# Patient Record
Sex: FEMALE | Race: WHITE | Employment: PART TIME | URBAN - METROPOLITAN AREA
[De-identification: names, ages, dates, MRNs, and addresses within clinical notes are randomized per-mention and may not be internally consistent; named-entity substitution may affect disease eponyms.]

---

## 2024-09-03 ENCOUNTER — OFFICE VISIT (OUTPATIENT)
Dept: PODIATRY | Age: 74
End: 2024-09-03
Payer: MEDICARE

## 2024-09-03 VITALS — HEIGHT: 65 IN | WEIGHT: 174 LBS | BODY MASS INDEX: 28.99 KG/M2

## 2024-09-03 DIAGNOSIS — M79.671 RIGHT FOOT PAIN: Primary | ICD-10-CM

## 2024-09-03 DIAGNOSIS — R26.2 DIFFICULTY WALKING: ICD-10-CM

## 2024-09-03 DIAGNOSIS — M79.671 PAIN OF RIGHT FOOT: ICD-10-CM

## 2024-09-03 DIAGNOSIS — M21.621 TAILOR'S BUNION OF RIGHT FOOT: ICD-10-CM

## 2024-09-03 DIAGNOSIS — L84 CORNS AND CALLUS: Primary | ICD-10-CM

## 2024-09-03 PROCEDURE — 1090F PRES/ABSN URINE INCON ASSESS: CPT | Performed by: PODIATRIST

## 2024-09-03 PROCEDURE — G8427 DOCREV CUR MEDS BY ELIG CLIN: HCPCS | Performed by: PODIATRIST

## 2024-09-03 PROCEDURE — 4004F PT TOBACCO SCREEN RCVD TLK: CPT | Performed by: PODIATRIST

## 2024-09-03 PROCEDURE — 1123F ACP DISCUSS/DSCN MKR DOCD: CPT | Performed by: PODIATRIST

## 2024-09-03 PROCEDURE — G8400 PT W/DXA NO RESULTS DOC: HCPCS | Performed by: PODIATRIST

## 2024-09-03 PROCEDURE — G8419 CALC BMI OUT NRM PARAM NOF/U: HCPCS | Performed by: PODIATRIST

## 2024-09-03 PROCEDURE — 3017F COLORECTAL CA SCREEN DOC REV: CPT | Performed by: PODIATRIST

## 2024-09-03 PROCEDURE — 99203 OFFICE O/P NEW LOW 30 MIN: CPT | Performed by: PODIATRIST

## 2024-09-03 RX ORDER — FUROSEMIDE 40 MG
40 TABLET ORAL DAILY
COMMUNITY

## 2024-09-03 RX ORDER — LEVOTHYROXINE SODIUM 50 UG/1
50 TABLET ORAL DAILY
COMMUNITY

## 2024-09-03 NOTE — PROGRESS NOTES
9/3/24     Ashley Miramontes    : 1950 Sex: female   Age: 74 y.o.    Patient was referred by: None  Patient's PCP/Provider is:  Diogenes Plummer DO    Subjective:    Patient seen today for evaluation regarding painful corn/callosity right plantar forefoot region    Chief Complaint   Patient presents with    Foot Pain     Right foot pain, bunion       HPI: Patient stated she has had issues for several months now.  She has not tried any OTC treatments to this point in time.  She does wear custom inserts which were fabricated for her several years ago.  Patient has had multiple surgeries performed on both feet in the past.  No other additional abnormalities noted.    ROS:  Const: Positives and pertinent negatives as per HPI.     Musculo: Denies symptoms other than stated above.  Neuro: Denies symptoms other than stated above.  Skin: Denies symptoms other than stated above.    Current Medications:    Current Outpatient Medications:     levothyroxine (SYNTHROID) 50 MCG tablet, Take 1 tablet by mouth Daily, Disp: , Rfl:     furosemide (LASIX) 40 MG tablet, Take 1 tablet by mouth daily, Disp: , Rfl:     potassium & sodium phosphates (PHOS-NAK) 280-160-250 MG PACK, Take 1 packet by mouth 4 times daily, Disp: , Rfl:     Allergies:  No Known Allergies    Vitals:    24 1403   Weight: 78.9 kg (174 lb)   Height: 1.651 m (5' 5\")        No past medical history on file.  No family history on file.  No past surgical history on file.           Diagnostic studies:    No results found.      Procedures:    None    Exam:  VASCULAR: Pedal pulses palpable right foot.  Capillary refill time less than 5 seconds digits 1 through 5 right foot.  Diminished hair growth noted right lower extremity  NEUROLOGICAL: Epicritic sensations intact right foot and digital regions  DERMATOLOGICAL: Soft tissue neoplasm noted plantar right fifth MTPJ regions.  Upon partial-thickness debridement, no underlying ulcerations or any signs of

## 2024-09-03 NOTE — PROGRESS NOTES
New patient here for right foot pain, bunion. Patient has had previous foot surgery. Diogenes Plummer DO last visit 8/26/2024   Electronically signed by Cinthya Dunn LPN on 9/3/2024 at 2:06 PM

## 2024-09-10 ENCOUNTER — OFFICE VISIT (OUTPATIENT)
Dept: PODIATRY | Age: 74
End: 2024-09-10
Payer: MEDICARE

## 2024-09-10 VITALS — HEIGHT: 65 IN | BODY MASS INDEX: 28.99 KG/M2 | WEIGHT: 174 LBS

## 2024-09-10 DIAGNOSIS — L84 CORNS AND CALLUS: Primary | ICD-10-CM

## 2024-09-10 DIAGNOSIS — M21.621 TAILOR'S BUNION OF RIGHT FOOT: ICD-10-CM

## 2024-09-10 DIAGNOSIS — R26.2 DIFFICULTY WALKING: ICD-10-CM

## 2024-09-10 PROCEDURE — 3017F COLORECTAL CA SCREEN DOC REV: CPT | Performed by: PODIATRIST

## 2024-09-10 PROCEDURE — 99213 OFFICE O/P EST LOW 20 MIN: CPT | Performed by: PODIATRIST

## 2024-09-10 PROCEDURE — 1123F ACP DISCUSS/DSCN MKR DOCD: CPT | Performed by: PODIATRIST

## 2024-09-10 PROCEDURE — 1090F PRES/ABSN URINE INCON ASSESS: CPT | Performed by: PODIATRIST

## 2024-09-10 PROCEDURE — G8419 CALC BMI OUT NRM PARAM NOF/U: HCPCS | Performed by: PODIATRIST

## 2024-09-10 PROCEDURE — G8427 DOCREV CUR MEDS BY ELIG CLIN: HCPCS | Performed by: PODIATRIST

## 2024-09-10 PROCEDURE — G8400 PT W/DXA NO RESULTS DOC: HCPCS | Performed by: PODIATRIST

## 2024-09-10 PROCEDURE — 4004F PT TOBACCO SCREEN RCVD TLK: CPT | Performed by: PODIATRIST

## 2024-10-09 ENCOUNTER — OFFICE VISIT (OUTPATIENT)
Dept: PODIATRY | Age: 74
End: 2024-10-09
Payer: MEDICARE

## 2024-10-09 VITALS — HEIGHT: 65 IN | WEIGHT: 174 LBS | BODY MASS INDEX: 28.99 KG/M2

## 2024-10-09 DIAGNOSIS — M79.671 PAIN OF RIGHT FOOT: ICD-10-CM

## 2024-10-09 DIAGNOSIS — S90.851A FOREIGN BODY IN RIGHT FOOT, INITIAL ENCOUNTER: Primary | ICD-10-CM

## 2024-10-09 DIAGNOSIS — R26.2 DIFFICULTY WALKING: ICD-10-CM

## 2024-10-09 PROCEDURE — 1123F ACP DISCUSS/DSCN MKR DOCD: CPT | Performed by: PODIATRIST

## 2024-10-09 PROCEDURE — 1090F PRES/ABSN URINE INCON ASSESS: CPT | Performed by: PODIATRIST

## 2024-10-09 PROCEDURE — 99213 OFFICE O/P EST LOW 20 MIN: CPT | Performed by: PODIATRIST

## 2024-10-09 PROCEDURE — G8419 CALC BMI OUT NRM PARAM NOF/U: HCPCS | Performed by: PODIATRIST

## 2024-10-09 PROCEDURE — 3017F COLORECTAL CA SCREEN DOC REV: CPT | Performed by: PODIATRIST

## 2024-10-09 PROCEDURE — G8427 DOCREV CUR MEDS BY ELIG CLIN: HCPCS | Performed by: PODIATRIST

## 2024-10-09 PROCEDURE — G8400 PT W/DXA NO RESULTS DOC: HCPCS | Performed by: PODIATRIST

## 2024-10-09 PROCEDURE — G8484 FLU IMMUNIZE NO ADMIN: HCPCS | Performed by: PODIATRIST

## 2024-10-09 PROCEDURE — 28190 REMOVAL OF FOOT FOREIGN BODY: CPT | Performed by: PODIATRIST

## 2024-10-09 PROCEDURE — 4004F PT TOBACCO SCREEN RCVD TLK: CPT | Performed by: PODIATRIST

## 2024-10-09 NOTE — PROGRESS NOTES
Patient here for right foot pain. Pain is under great toe. Patient felt that she had a stone in her sandal. Diogenes Plummer DO last visit 9/14/2024   Electronically signed by Cinthya Dunn LPN on 10/9/2024 at 8:10 AM

## 2024-10-09 NOTE — PROGRESS NOTES
10/9/24     Ashley Miramontes    : 1950   Sex: female    Age: 74 y.o.    Patient's PCP/Provider is:  Diogenes Plummer DO    Subjective:  Patient is seen today for evaluation regarding continued care regarding new onset possible foreign body right foot.  Patient stated she has been wearing sandals over the summer did recently have a pedicure and noticed some pain into the plantar right great toe region.  Patient has been soaking the area over the last several nights without improvement noted.  Patient is in no acute distress.  She denies any nausea, vomiting, fever, chills.  No other additional abnormalities noted at this time.    Chief Complaint   Patient presents with    Foot Pain     Right foot pain, under great toe       ROS:  Const: Positives and pertinent negatives as per HPI.    Musculo: Denies symptoms other than stated above.  Neuro: Denies symptoms other than stated above.  Skin: Denies symptoms other than stated above.    Current Medications:    Current Outpatient Medications:     levothyroxine (SYNTHROID) 50 MCG tablet, Take 1 tablet by mouth Daily, Disp: , Rfl:     furosemide (LASIX) 40 MG tablet, Take 1 tablet by mouth daily, Disp: , Rfl:     potassium & sodium phosphates (PHOS-NAK) 280-160-250 MG PACK, Take 1 packet by mouth 4 times daily, Disp: , Rfl:     Allergies:  Allergies   Allergen Reactions    Hydrocodone     Seasonal      Hay fever       Vitals:    10/09/24 0808   Weight: 78.9 kg (174 lb)   Height: 1.651 m (5' 5\")       Exam:  Neurovascular status unchanged.  Callused area noted with central darkening plantar aspect right first MTPJ region.  Upon partial thickness debridement with a #15 blade small abscess was released from the area with small piece of glass noted.  All hyperkeratotic tissue in periphery was debrided.  No underlying ulceration or any signs of infection noted right foot.  Adequate range of motion noted digital regions right foot.  Improved gait noted after foreign

## 2024-10-16 ENCOUNTER — OFFICE VISIT (OUTPATIENT)
Dept: PODIATRY | Age: 74
End: 2024-10-16
Payer: MEDICARE

## 2024-10-16 VITALS — HEIGHT: 65 IN | WEIGHT: 174 LBS | BODY MASS INDEX: 28.99 KG/M2

## 2024-10-16 DIAGNOSIS — S90.851A FOREIGN BODY IN RIGHT FOOT, INITIAL ENCOUNTER: Primary | ICD-10-CM

## 2024-10-16 DIAGNOSIS — R26.2 DIFFICULTY WALKING: ICD-10-CM

## 2024-10-16 PROCEDURE — G8484 FLU IMMUNIZE NO ADMIN: HCPCS | Performed by: PODIATRIST

## 2024-10-16 PROCEDURE — 4004F PT TOBACCO SCREEN RCVD TLK: CPT | Performed by: PODIATRIST

## 2024-10-16 PROCEDURE — 3017F COLORECTAL CA SCREEN DOC REV: CPT | Performed by: PODIATRIST

## 2024-10-16 PROCEDURE — 99213 OFFICE O/P EST LOW 20 MIN: CPT | Performed by: PODIATRIST

## 2024-10-16 PROCEDURE — 1090F PRES/ABSN URINE INCON ASSESS: CPT | Performed by: PODIATRIST

## 2024-10-16 PROCEDURE — G8400 PT W/DXA NO RESULTS DOC: HCPCS | Performed by: PODIATRIST

## 2024-10-16 PROCEDURE — G8419 CALC BMI OUT NRM PARAM NOF/U: HCPCS | Performed by: PODIATRIST

## 2024-10-16 PROCEDURE — 1123F ACP DISCUSS/DSCN MKR DOCD: CPT | Performed by: PODIATRIST

## 2024-10-16 PROCEDURE — G8427 DOCREV CUR MEDS BY ELIG CLIN: HCPCS | Performed by: PODIATRIST

## 2024-10-16 NOTE — PROGRESS NOTES
Patient here for follow-up of right foot foreign body. Patient states her foot feels much better. Diogenes Plummer DO   Electronically signed by Cinthya Dunn LPN on 10/16/2024 at 10:31 AM    
evaluation and management, if any additional Podiatric issues arise.      Seen By:    Joao Álvarez Jr, DPM    Electronically signed by Joao Álvarez Jr, DPM on 10/16/2024 at 10:48 AM    This note was created using voice recognition software.  The note was reviewed however may contain grammatical errors.

## 2024-12-23 ENCOUNTER — HOSPITAL ENCOUNTER (EMERGENCY)
Age: 74
Discharge: HOME | End: 2024-12-23
Attending: EMERGENCY MEDICINE
Payer: MEDICARE

## 2024-12-23 ENCOUNTER — APPOINTMENT (OUTPATIENT)
Dept: CT IMAGING | Age: 74
End: 2024-12-23
Payer: MEDICARE

## 2024-12-23 VITALS
TEMPERATURE: 97.8 F | HEART RATE: 69 BPM | DIASTOLIC BLOOD PRESSURE: 82 MMHG | SYSTOLIC BLOOD PRESSURE: 144 MMHG | OXYGEN SATURATION: 99 % | RESPIRATION RATE: 16 BRPM

## 2024-12-23 DIAGNOSIS — G89.18 POST-OP PAIN: Primary | ICD-10-CM

## 2024-12-23 LAB
ANION GAP SERPL CALCULATED.3IONS-SCNC: 7 MMOL/L (ref 7–16)
BASOPHILS # BLD: 0.06 K/UL (ref 0–0.2)
BASOPHILS NFR BLD: 1 % (ref 0–2)
BUN SERPL-MCNC: 16 MG/DL (ref 6–23)
CALCIUM SERPL-MCNC: 9.5 MG/DL (ref 8.6–10.2)
CHLORIDE SERPL-SCNC: 107 MMOL/L (ref 98–107)
CO2 SERPL-SCNC: 29 MMOL/L (ref 22–29)
CREAT SERPL-MCNC: 0.7 MG/DL (ref 0.5–1)
EOSINOPHIL # BLD: 0.12 K/UL (ref 0.05–0.5)
EOSINOPHILS RELATIVE PERCENT: 1 % (ref 0–6)
ERYTHROCYTE [DISTWIDTH] IN BLOOD BY AUTOMATED COUNT: 12.8 % (ref 11.5–15)
GFR, ESTIMATED: 88 ML/MIN/1.73M2
GLUCOSE SERPL-MCNC: 95 MG/DL (ref 74–99)
HCT VFR BLD AUTO: 40.3 % (ref 34–48)
HGB BLD-MCNC: 12.9 G/DL (ref 11.5–15.5)
IMM GRANULOCYTES # BLD AUTO: 0.03 K/UL (ref 0–0.58)
IMM GRANULOCYTES NFR BLD: 0 % (ref 0–5)
LACTATE BLDV-SCNC: 0.6 MMOL/L (ref 0.5–2.2)
LYMPHOCYTES NFR BLD: 1.46 K/UL (ref 1.5–4)
LYMPHOCYTES RELATIVE PERCENT: 14 % (ref 20–42)
MCH RBC QN AUTO: 29.1 PG (ref 26–35)
MCHC RBC AUTO-ENTMCNC: 32 G/DL (ref 32–34.5)
MCV RBC AUTO: 91 FL (ref 80–99.9)
MONOCYTES NFR BLD: 0.94 K/UL (ref 0.1–0.95)
MONOCYTES NFR BLD: 9 % (ref 2–12)
NEUTROPHILS NFR BLD: 74 % (ref 43–80)
NEUTS SEG NFR BLD: 7.55 K/UL (ref 1.8–7.3)
PLATELET # BLD AUTO: 270 K/UL (ref 130–450)
PMV BLD AUTO: 9.6 FL (ref 7–12)
POTASSIUM SERPL-SCNC: 4.1 MMOL/L (ref 3.5–5)
RBC # BLD AUTO: 4.43 M/UL (ref 3.5–5.5)
SODIUM SERPL-SCNC: 143 MMOL/L (ref 132–146)
WBC OTHER # BLD: 10.2 K/UL (ref 4.5–11.5)

## 2024-12-23 PROCEDURE — 80048 BASIC METABOLIC PNL TOTAL CA: CPT

## 2024-12-23 PROCEDURE — 70491 CT SOFT TISSUE NECK W/DYE: CPT

## 2024-12-23 PROCEDURE — 6360000004 HC RX CONTRAST MEDICATION: Performed by: RADIOLOGY

## 2024-12-23 PROCEDURE — 83605 ASSAY OF LACTIC ACID: CPT

## 2024-12-23 PROCEDURE — 99285 EMERGENCY DEPT VISIT HI MDM: CPT

## 2024-12-23 PROCEDURE — 85025 COMPLETE CBC W/AUTO DIFF WBC: CPT

## 2024-12-23 RX ORDER — IOPAMIDOL 755 MG/ML
75 INJECTION, SOLUTION INTRAVASCULAR
Status: COMPLETED | OUTPATIENT
Start: 2024-12-23 | End: 2024-12-23

## 2024-12-23 RX ADMIN — IOPAMIDOL 75 ML: 755 INJECTION, SOLUTION INTRAVENOUS at 13:02

## 2024-12-23 ASSESSMENT — PAIN SCALES - GENERAL: PAINLEVEL_OUTOF10: 5

## 2024-12-23 ASSESSMENT — PAIN - FUNCTIONAL ASSESSMENT: PAIN_FUNCTIONAL_ASSESSMENT: 0-10

## 2024-12-23 ASSESSMENT — PAIN DESCRIPTION - LOCATION: LOCATION: NECK

## 2024-12-23 NOTE — ED PROVIDER NOTES
during this visit were within normal range or not returned as of this dictation.    RADIOLOGY:   Radiology studies interpreted by the radiologist unless otherwise specified.      CT SOFT TISSUE NECK W CONTRAST   Final Result   1. 2.5 x 1.5 x 1.6 cm cystic nodule in the left thyroid lobe. Minimal fluid   interposed between the posterior aspect of this nodule and the adjacent   carotid space, which may be related to recent aspiration. No evidence of   abscess or soft tissue gas.   2. Multiple additional cystic and solid thyroid nodules.   3. No cervical lymphadenopathy.           No results found.    No results found.    PROCEDURES   Unless otherwise noted below, none       PAST MEDICAL HISTORY/Chronic Conditions Affecting Care      has no past medical history on file.     EMERGENCY DEPARTMENT COURSE    Vitals:    Vitals:    12/23/24 1032 12/23/24 1050   BP:  (!) 144/82   Pulse:  69   Resp:  16   Temp: 97.8 °F (36.6 °C)    SpO2:  99%       Patient was given the following medications:  Medications   iopamidol (ISOVUE-370) 76 % injection 75 mL (75 mLs IntraVENous Given 12/23/24 1302)         Is this patient to be included in the SEP-1 core measure? No Exclusion criteria - the patient is NOT to be included for SEP-1 Core Measure due to: Infection is not suspected        Medical Decision Making/Differential Diagnosis:    CC/HPI Summary, Social Determinants of health, Records Reviewed, DDx, testing done/not done, ED Course, Reassessment, disposition considerations/shared decision making with patient, consults, disposition:            Medical Decision Making  Amount and/or Complexity of Data Reviewed  Labs: ordered.  Radiology: ordered.        74-year-old female noncontributory past medical history presents today with concern for pain and swelling of the left side of her neck after thyroid observation 4 weeks ago.  Hemodynamically stable on arrival to the emerged part with, nontoxic in no acute distress.  Physical exam

## 2025-02-17 ENCOUNTER — APPOINTMENT (OUTPATIENT)
Dept: GENERAL RADIOLOGY | Age: 75
End: 2025-02-17
Payer: MEDICARE

## 2025-02-17 ENCOUNTER — HOSPITAL ENCOUNTER (EMERGENCY)
Age: 75
Discharge: HOME | End: 2025-02-17
Attending: STUDENT IN AN ORGANIZED HEALTH CARE EDUCATION/TRAINING PROGRAM
Payer: MEDICARE

## 2025-02-17 VITALS
RESPIRATION RATE: 18 BRPM | SYSTOLIC BLOOD PRESSURE: 156 MMHG | HEART RATE: 53 BPM | OXYGEN SATURATION: 100 % | DIASTOLIC BLOOD PRESSURE: 69 MMHG | TEMPERATURE: 97.8 F

## 2025-02-17 DIAGNOSIS — R42 LIGHTHEADEDNESS: Primary | ICD-10-CM

## 2025-02-17 LAB
ALBUMIN SERPL-MCNC: 4.3 G/DL (ref 3.5–5.2)
ALP SERPL-CCNC: 103 U/L (ref 35–104)
ALT SERPL-CCNC: 10 U/L (ref 0–32)
ANION GAP SERPL CALCULATED.3IONS-SCNC: 8 MMOL/L (ref 7–16)
AST SERPL-CCNC: 12 U/L (ref 0–31)
BASOPHILS # BLD: 0.05 K/UL (ref 0–0.2)
BASOPHILS NFR BLD: 1 % (ref 0–2)
BILIRUB SERPL-MCNC: 0.2 MG/DL (ref 0–1.2)
BUN SERPL-MCNC: 16 MG/DL (ref 6–23)
CALCIUM SERPL-MCNC: 8.8 MG/DL (ref 8.6–10.2)
CHLORIDE SERPL-SCNC: 104 MMOL/L (ref 98–107)
CO2 SERPL-SCNC: 26 MMOL/L (ref 22–29)
CREAT SERPL-MCNC: 0.8 MG/DL (ref 0.5–1)
EOSINOPHIL # BLD: 0.02 K/UL (ref 0.05–0.5)
EOSINOPHILS RELATIVE PERCENT: 0 % (ref 0–6)
ERYTHROCYTE [DISTWIDTH] IN BLOOD BY AUTOMATED COUNT: 13.2 % (ref 11.5–15)
GFR, ESTIMATED: 77 ML/MIN/1.73M2
GLUCOSE SERPL-MCNC: 133 MG/DL (ref 74–99)
HCT VFR BLD AUTO: 42.2 % (ref 34–48)
HGB BLD-MCNC: 13.1 G/DL (ref 11.5–15.5)
IMM GRANULOCYTES # BLD AUTO: 0.03 K/UL (ref 0–0.58)
IMM GRANULOCYTES NFR BLD: 0 % (ref 0–5)
LYMPHOCYTES NFR BLD: 1.69 K/UL (ref 1.5–4)
LYMPHOCYTES RELATIVE PERCENT: 20 % (ref 20–42)
MAGNESIUM SERPL-MCNC: 2.2 MG/DL (ref 1.6–2.6)
MCH RBC QN AUTO: 28.2 PG (ref 26–35)
MCHC RBC AUTO-ENTMCNC: 31 G/DL (ref 32–34.5)
MCV RBC AUTO: 90.9 FL (ref 80–99.9)
MONOCYTES NFR BLD: 0.6 K/UL (ref 0.1–0.95)
MONOCYTES NFR BLD: 7 % (ref 2–12)
NEUTROPHILS NFR BLD: 71 % (ref 43–80)
NEUTS SEG NFR BLD: 5.92 K/UL (ref 1.8–7.3)
PLATELET # BLD AUTO: 278 K/UL (ref 130–450)
PMV BLD AUTO: 9.7 FL (ref 7–12)
POTASSIUM SERPL-SCNC: 3.8 MMOL/L (ref 3.5–5)
PROT SERPL-MCNC: 7.2 G/DL (ref 6.4–8.3)
RBC # BLD AUTO: 4.64 M/UL (ref 3.5–5.5)
SODIUM SERPL-SCNC: 138 MMOL/L (ref 132–146)
TROPONIN I SERPL HS-MCNC: 6 NG/L (ref 0–9)
WBC OTHER # BLD: 8.3 K/UL (ref 4.5–11.5)

## 2025-02-17 PROCEDURE — 2580000003 HC RX 258: Performed by: STUDENT IN AN ORGANIZED HEALTH CARE EDUCATION/TRAINING PROGRAM

## 2025-02-17 PROCEDURE — 85025 COMPLETE CBC W/AUTO DIFF WBC: CPT

## 2025-02-17 PROCEDURE — 80053 COMPREHEN METABOLIC PANEL: CPT

## 2025-02-17 PROCEDURE — 83735 ASSAY OF MAGNESIUM: CPT

## 2025-02-17 PROCEDURE — 71045 X-RAY EXAM CHEST 1 VIEW: CPT

## 2025-02-17 PROCEDURE — 84484 ASSAY OF TROPONIN QUANT: CPT

## 2025-02-17 PROCEDURE — 99285 EMERGENCY DEPT VISIT HI MDM: CPT

## 2025-02-17 RX ORDER — 0.9 % SODIUM CHLORIDE 0.9 %
1000 INTRAVENOUS SOLUTION INTRAVENOUS ONCE
Status: COMPLETED | OUTPATIENT
Start: 2025-02-17 | End: 2025-02-17

## 2025-02-17 RX ADMIN — SODIUM CHLORIDE 1000 ML: 0.9 INJECTION, SOLUTION INTRAVENOUS at 12:58

## 2025-02-17 ASSESSMENT — LIFESTYLE VARIABLES: HOW OFTEN DO YOU HAVE A DRINK CONTAINING ALCOHOL: NEVER

## 2025-02-17 NOTE — DISCHARGE INSTRUCTIONS
Return if any new or worsening symptoms.  Return if any chest pain or shortness of breath.  Follow-up with your primary care provider.  You are seen for lightheadedness.  You mentioned they are to see her primary care provider tomorrow.  I am encouraging you to follow-up with your cardiologist as well.  If you develop worsening chest pain you should return to the emergency room.

## 2025-02-17 NOTE — ED NOTES
Patient requesting update on results. Upon review of charts labs still not resulted. Lab called and per lab unable to find specimens. Labs re-orered

## 2025-02-17 NOTE — ED PROVIDER NOTES
[facility] EMERGENCY DEPARTMENT  EMERGENCY DEPARTMENT ENCOUNTER    Pt Name: Ashley Miramontes  MRN: [id number]  Birthdate 1950  Date of evaluation: 2/17/2025  Provider: Juan Carlos Gomez MD  PCP: Diogenes Plummer DO  Note Started: 12:25 PM EST 2/17/25    HPI     Patient is a 75 y.o. female presents with a chief complaint of   Chief Complaint   Patient presents with    Dizziness     Pt has been dizzy since yesterday. Hx of Afib. Unable to take blood thinners.    .    Patient presents for dizziness.  Patient stated that she has a history of A-fib but has not been on anticoagulation and seen multiple cardiologist for this.  Patient stated that she stood up and felt lightheaded.  Had the palpitations.  Denied any chest pain or shortness of breath.  Patient denies any fevers or chills.  Patient denies any medication changes.  No changes in urinary or bowel habits.  Patient stated that the symptoms only occur if she stands up too quickly and after she stands it goes away.    Nursing Notes were all reviewed and agreed with or any disagreements were addressed in the HPI.    History From: Patient    Review of Systems   Pertinent positives and negatives as per HPI.     Physical Exam  Vitals and nursing note reviewed.   Constitutional:       Appearance: She is well-developed.   HENT:      Head: Normocephalic and atraumatic.   Eyes:      Conjunctiva/sclera: Conjunctivae normal.   Cardiovascular:      Rate and Rhythm: Normal rate and regular rhythm.      Heart sounds: Normal heart sounds. No murmur heard.  Pulmonary:      Effort: Pulmonary effort is normal. No respiratory distress.      Breath sounds: Normal breath sounds. No wheezing or rales.   Abdominal:      General: Bowel sounds are normal.      Palpations: Abdomen is soft.      Tenderness: There is no abdominal tenderness. There is no guarding or rebound.   Musculoskeletal:      Cervical back: Normal range of motion and neck supple.

## 2025-02-17 NOTE — ED NOTES
Department of Emergency Medicine  FIRST PROVIDER TRIAGE NOTE             Independent MLP           2/17/25  11:27 AM EST    Date of Encounter: 2/17/25   MRN: [id number]      HPI: Ashley Miramontes is a 75 y.o. female who presents to the ED for No chief complaint on file.     IN AND OUT OF AF YESTERDAY, LIGHTHEADED WHEN BENDING FORWARD AND WHEN STANDING TOO FAST. CARDIOLOGIST TOLD PT TO COME TO ER.  EP IS AT [facility], CARDIOLOGIST IS AT [place]    ROS: Negative for cp or sob.    PE: Gen Appearance/Constitutional: alert  HEENT: NC/NT. PERRLA,  Airway patent.    Provider-Patient relationship only established for Provider In Triage (PIT)  Full assessment, HPI and examination not performed, therefore, it is not yet possible to state whether or not an emergency medical condition exists   Focused assessment only during triage. This is not a comprehensive evaluation due to no physical ER space, staff shortage and high numbers of boarding patients in the ER.       Initial Plan of Care: All treatment areas with department are currently occupied. Plan to order/Initiate the following while awaiting opening in ED.  Initiate Treatment-Testing, Proceed toTreatment Area When Bed Available for ED Attending/MLP to Continue Care    Electronically signed by JOSE Belle CNP   DD: 2/17/25      Roly Guerra APRN - CNP  02/17/25 1128

## 2025-02-18 ENCOUNTER — TELEPHONE (OUTPATIENT)
Dept: CARDIOLOGY CLINIC | Age: 75
End: 2025-02-18

## 2025-02-18 DIAGNOSIS — I48.91 ATRIAL FIBRILLATION, UNSPECIFIED TYPE (HCC): Primary | ICD-10-CM

## 2025-02-18 NOTE — TELEPHONE ENCOUNTER
Patient seen in ER yesterday, previously followed with Dr. Esqueda.  Asking to follow with Dr. Giron as her  is a current patient.

## 2025-02-18 NOTE — TELEPHONE ENCOUNTER
It looks like she follows with EP at [facility] because of paroxysmal atrial fibrillation, watchman, AVNRT status post ablation.  I do not see that she has any general cardiology issues.  She really should just follow with the EP.  We can set her up with EP here if she would like.  Please see if he can obtain any other records from [place] to see if there is any general cardiology issues.

## 2025-02-19 NOTE — TELEPHONE ENCOUNTER
Patient notified of Dr. Giron's assessment/ recommendation. Patient states she will attempt to get her records but she does not have any general cardiology issues, she only has the AFIB and would like to follow with EP locally.  Referral placed.

## 2025-02-22 LAB
EKG ATRIAL RATE: 53 BPM
EKG P AXIS: 23 DEGREES
EKG P-R INTERVAL: 134 MS
EKG Q-T INTERVAL: 444 MS
EKG QRS DURATION: 98 MS
EKG QTC CALCULATION (BAZETT): 416 MS
EKG R AXIS: 3 DEGREES
EKG T AXIS: 6 DEGREES
EKG VENTRICULAR RATE: 53 BPM

## 2025-07-02 ENCOUNTER — TELEPHONE (OUTPATIENT)
Dept: NON INVASIVE DIAGNOSTICS | Age: 75
End: 2025-07-02

## 2025-07-02 ENCOUNTER — OFFICE VISIT (OUTPATIENT)
Dept: NON INVASIVE DIAGNOSTICS | Age: 75
End: 2025-07-02
Payer: MEDICARE

## 2025-07-02 VITALS
OXYGEN SATURATION: 97 % | HEIGHT: 63 IN | HEART RATE: 48 BPM | RESPIRATION RATE: 18 BRPM | WEIGHT: 181 LBS | BODY MASS INDEX: 32.07 KG/M2 | SYSTOLIC BLOOD PRESSURE: 130 MMHG | DIASTOLIC BLOOD PRESSURE: 80 MMHG | TEMPERATURE: 97.3 F

## 2025-07-02 DIAGNOSIS — I51.9 HEART PROBLEM: Primary | ICD-10-CM

## 2025-07-02 PROCEDURE — 1123F ACP DISCUSS/DSCN MKR DOCD: CPT | Performed by: STUDENT IN AN ORGANIZED HEALTH CARE EDUCATION/TRAINING PROGRAM

## 2025-07-02 PROCEDURE — 1036F TOBACCO NON-USER: CPT | Performed by: STUDENT IN AN ORGANIZED HEALTH CARE EDUCATION/TRAINING PROGRAM

## 2025-07-02 PROCEDURE — G8400 PT W/DXA NO RESULTS DOC: HCPCS | Performed by: STUDENT IN AN ORGANIZED HEALTH CARE EDUCATION/TRAINING PROGRAM

## 2025-07-02 PROCEDURE — 1160F RVW MEDS BY RX/DR IN RCRD: CPT | Performed by: STUDENT IN AN ORGANIZED HEALTH CARE EDUCATION/TRAINING PROGRAM

## 2025-07-02 PROCEDURE — 1159F MED LIST DOCD IN RCRD: CPT | Performed by: STUDENT IN AN ORGANIZED HEALTH CARE EDUCATION/TRAINING PROGRAM

## 2025-07-02 PROCEDURE — G8427 DOCREV CUR MEDS BY ELIG CLIN: HCPCS | Performed by: STUDENT IN AN ORGANIZED HEALTH CARE EDUCATION/TRAINING PROGRAM

## 2025-07-02 PROCEDURE — 1090F PRES/ABSN URINE INCON ASSESS: CPT | Performed by: STUDENT IN AN ORGANIZED HEALTH CARE EDUCATION/TRAINING PROGRAM

## 2025-07-02 PROCEDURE — 99205 OFFICE O/P NEW HI 60 MIN: CPT | Performed by: STUDENT IN AN ORGANIZED HEALTH CARE EDUCATION/TRAINING PROGRAM

## 2025-07-02 PROCEDURE — G8417 CALC BMI ABV UP PARAM F/U: HCPCS | Performed by: STUDENT IN AN ORGANIZED HEALTH CARE EDUCATION/TRAINING PROGRAM

## 2025-07-02 PROCEDURE — 3017F COLORECTAL CA SCREEN DOC REV: CPT | Performed by: STUDENT IN AN ORGANIZED HEALTH CARE EDUCATION/TRAINING PROGRAM

## 2025-07-02 PROCEDURE — 93000 ELECTROCARDIOGRAM COMPLETE: CPT | Performed by: STUDENT IN AN ORGANIZED HEALTH CARE EDUCATION/TRAINING PROGRAM

## 2025-07-02 RX ORDER — CLINDAMYCIN HYDROCHLORIDE 300 MG/1
CAPSULE ORAL
COMMUNITY

## 2025-07-02 RX ORDER — LATANOPROST 50 UG/ML
SOLUTION/ DROPS OPHTHALMIC
COMMUNITY
Start: 2024-10-05

## 2025-07-02 RX ORDER — TRAMADOL HYDROCHLORIDE 50 MG/1
50 TABLET ORAL EVERY 8 HOURS PRN
COMMUNITY

## 2025-07-02 RX ORDER — ERGOCALCIFEROL 1.25 MG/1
50000 CAPSULE ORAL DAILY
COMMUNITY

## 2025-07-02 NOTE — PATIENT INSTRUCTIONS
No medication changes at this time.  You will be scheduled for cardiac monitor implant.  Follow-up with this office will be arranged based on date of the procedure.

## 2025-07-02 NOTE — TELEPHONE ENCOUNTER
Per Dr Del Rosario schedule patient for ILR Implant [facility].  Patient is scheduled for ILR implant 7/21/25 at 10:30 am (arrival of 9:30 am).  Patient given instructions and verbalized understanding.

## 2025-07-02 NOTE — PROGRESS NOTES
[facility] CARDIOLOGY  CARDIAC ELECTROPHYSIOLOGY DEPARTMENT/DIVISION OF CARDIOLOGY  Outpatient Consultation Report  PATIENT: Ashley Miramontes  MEDICAL RECORD NUMBER: [id number]  DATE OF SERVICE:  2025  ATTENDING ELECTROPHYSIOLOGIST:  Jacky Del Rosario DO  REFERRING PHYSICIAN: Joaquin Giron DO and Diogenes Plummer DO  CHIEF COMPLAINT: AF, AVNRT status post ablation in     HPI: Ashley Miramontes is a 75 y.o. female with a history of subclinical AF, PVCs AVNRT sp SP [facility]ablation ([facility] obesity, hematuria, kidney stone, and benign thyroid nodule sp biopsy (2024) complicated by hematoma.  She is managed by PCP with no cardiac medications. In 2024, she had AF detected on iWATCH.  This was reportedly a few minutes in duration.  She saw Dr Esqueda ([place] [facility]), who prescribed apixaban.  An event monitor was ordered, which reported AF burden <1% (longest episode: 4 minutes) and VE burden = 29%.  4 days after starting apixaban, she developed hematuria and apixaban was discontinued. She saw Urology at [facility].  CT scan reported kidney stone.  Cystoscopy reportedly was normal.  Urology recommended no OAC and EP referral.  Patient reports EP at [facility] felt she was not a candidate for WATCHMAN or ablation, but on review of that provider's notes appear she was felt to be a candidate for both of these procedures.  In 2024, she had biopsy of thyroid nodule, which was determined to be benign.  This was complicated by hematoma.  Additionally, she was noted to have bruises on her skin over her body.  She was referred to hematology due to her propensity for bleeding issues.  Per patient, testing to date has been normal.  She presents today, 2025; as a referral to my office for further evaluation/management of dysrhythmias.  She denies any complaints at this time.     Prior Cardiac Testin-day day event monitor (2024): Sinus,  bpm (mean: 66 bpm),

## 2025-07-18 ENCOUNTER — TELEPHONE (OUTPATIENT)
Dept: NON INVASIVE DIAGNOSTICS | Age: 75
End: 2025-07-18

## 2025-07-21 ENCOUNTER — HOSPITAL ENCOUNTER (OUTPATIENT)
Age: 75
Setting detail: OUTPATIENT SURGERY
Discharge: HOME HEALTH | End: 2025-07-21
Attending: STUDENT IN AN ORGANIZED HEALTH CARE EDUCATION/TRAINING PROGRAM | Admitting: STUDENT IN AN ORGANIZED HEALTH CARE EDUCATION/TRAINING PROGRAM
Payer: MEDICARE

## 2025-07-21 VITALS
WEIGHT: 177 LBS | OXYGEN SATURATION: 97 % | SYSTOLIC BLOOD PRESSURE: 125 MMHG | DIASTOLIC BLOOD PRESSURE: 67 MMHG | BODY MASS INDEX: 31.35 KG/M2 | RESPIRATION RATE: 20 BRPM | HEART RATE: 55 BPM | TEMPERATURE: 97.7 F

## 2025-07-21 DIAGNOSIS — I48.0 PAROXYSMAL ATRIAL FIBRILLATION (HCC): ICD-10-CM

## 2025-07-21 PROCEDURE — 7100000011 HC PHASE II RECOVERY - ADDTL 15 MIN: Performed by: STUDENT IN AN ORGANIZED HEALTH CARE EDUCATION/TRAINING PROGRAM

## 2025-07-21 PROCEDURE — 2709999900 HC NON-CHARGEABLE SUPPLY: Performed by: STUDENT IN AN ORGANIZED HEALTH CARE EDUCATION/TRAINING PROGRAM

## 2025-07-21 PROCEDURE — 6360000002 HC RX W HCPCS: Performed by: STUDENT IN AN ORGANIZED HEALTH CARE EDUCATION/TRAINING PROGRAM

## 2025-07-21 PROCEDURE — 7100000010 HC PHASE II RECOVERY - FIRST 15 MIN: Performed by: STUDENT IN AN ORGANIZED HEALTH CARE EDUCATION/TRAINING PROGRAM

## 2025-07-21 PROCEDURE — C1764 EVENT RECORDER, CARDIAC: HCPCS | Performed by: STUDENT IN AN ORGANIZED HEALTH CARE EDUCATION/TRAINING PROGRAM

## 2025-07-21 PROCEDURE — 33285 INSJ SUBQ CAR RHYTHM MNTR: CPT | Performed by: STUDENT IN AN ORGANIZED HEALTH CARE EDUCATION/TRAINING PROGRAM

## 2025-07-21 DEVICE — INSERTABLE CARDIAC MONITOR
Type: IMPLANTABLE DEVICE | Status: FUNCTIONAL
Brand: LUX-DX II+™

## 2025-07-21 RX ORDER — LIDOCAINE HYDROCHLORIDE 10 MG/ML
INJECTION, SOLUTION INFILTRATION; PERINEURAL PRN
Status: DISCONTINUED | OUTPATIENT
Start: 2025-07-21 | End: 2025-07-21 | Stop reason: HOSPADM

## 2025-07-21 NOTE — DISCHARGE INSTRUCTIONS
[facility]    Implantable Cardiac Monitor Discharge Instructions For Patients      Medications: Resume all prescribed medications. No medication changes at this time.    Follow Up:  Device Clinic: You will be seen on Tuesday 8/5/25 at 11:00 am at the [facility] for  an incision and device check.  If you need to reschedule this appointment call the office at [phone].  Dr. Del Rosario Office: You will be seen on Thursday 10/23/25 at 10:00 am for a follow up appointment at Dr. Del Rosario office.  If you need to reschedule this appointment call the office at [phone] .    Incision Care:   Brown (Aquacel) bandage: This is a waterproof bandage. Remove in 1 week Monday 7/28/25.  Surgical glue: located underneath the brown bandage. Do NOT pick at, scratch or remove the surgical glue from your incision. This will fall off on its own over the next several weeks. It is there to prevent infection.  Bathing: Keep the incision dry. You may shower tomorrow evening, but do NOT spray the water directly at the site or scrub at the site. Pat dry only with a clean towel. No soaking in a bathtub, hot tub, or pool for 6 weeks.    Daily monitoring: Check the area daily. Notify the office at [phone] if you develop any redness, swelling, drainage, warmth, or fever greater than 100 degrees.    Activity: You may continue regular daily activities, but try to prevent any hard blows to the incision site.    Driving: No driving until the day after your procedure.    Special Instructions: Let your dentist, doctor, or medical specialist know that you have an implantable cardiac monitor so precautions can be taken to protect the device. There is no need to take antibiotics prior to dental procedures. Your device is considered to be \"MRI conditional,\" meaning that under certain circumstances, MRI exams may be performed immediately post implantation. Your device may set off a metal detector,

## 2025-07-21 NOTE — H&P
[facility] CARDIOLOGY  CARDIAC ELECTROPHYSIOLOGY DEPARTMENT/DIVISION OF CARDIOLOGY  H&P  PATIENT: Ashley Miramontes  MEDICAL RECORD NUMBER: [id number]  DATE OF SERVICE:  2025  ATTENDING ELECTROPHYSIOLOGIST:  Jacky Del Rosario DO  REFERRING PHYSICIAN: No ref. provider found and Diogenes Plummer DO  CHIEF COMPLAINT: AF, AVNRT status post ablation in     HPI: Ashley Miramontes is a 75 y.o. female with a history of subclinical AF, PVCs AVNRT sp SP cryoablation (2000-Dr. Berg at [facility]), obesity, hematuria, kidney stone, and benign thyroid nodule sp biopsy (2024) complicated by hematoma.  She is managed by PCP with furosemide 40 mg daily. In 2024, she had AF detected on iWATCH.  This was reportedly a few minutes in duration.  She saw Dr Esqueda ([place] [facility]), who prescribed apixaban.  An event monitor was ordered, which reported AF burden <1% (longest episode: 4 minutes) and VE burden = 29%.  4 days after starting apixaban, she developed hematuria and apixaban was discontinued. She saw Urology at [facility].  CT scan reported kidney stone.  Cystoscopy reportedly was normal.  Urology recommended no OAC and EP referral.  Patient reports EP at [facility] felt she was not a candidate for WATCHMAN or ablation, but on review of that provider's notes appear she was felt to be a candidate for both of these procedures.  In 2024, she had biopsy of thyroid nodule, which was determined to be benign.  This was complicated by hematoma.  Additionally, she was noted to have bruises on her skin over her body.  She was referred to hematology due to her propensity for bleeding issues.  Per patient, testing to date has been normal. In 2025, she was referred to Dr Del Rosario, who recommended [employer] ILR implant. She presents today, 2025; for outpatient [employer] ILR implant.  She denies any complaints at this time.     Prior Cardiac Testin-day day event monitor (2024):

## 2025-07-28 ENCOUNTER — TELEPHONE (OUTPATIENT)
Dept: NON INVASIVE DIAGNOSTICS | Age: 75
End: 2025-07-28

## 2025-07-28 NOTE — TELEPHONE ENCOUNTER
Patient called and stated that she has a bruise below ILR implant ( 7/21/25 implant date).  Patient denies any extreme tenderness, redness, swelling, discharge or fever.  Per ANA ROSA Lewis patient is to call back if bruise worsens or symptoms develop and she will be scheduled sooner with device clinic.  Patient verbalized understanding and will call with any changes.

## 2025-07-29 ENCOUNTER — TELEPHONE (OUTPATIENT)
Age: 75
End: 2025-07-29

## 2025-07-29 NOTE — TELEPHONE ENCOUNTER
I called Ashley to see how she's doing since her Linq insertion on 7/21/25.  She states she's doing well. Denies any bleeding, drainage, or swelling from insertion site. Aquacel dressing was removed she says the site is healing well. She does say she has a large bruise that she already notified the office about and she is keeping an eye on it. She denies any hardness around the area, swelling or pain. She will call the office if she has an further concerns about the bruising between now and her appt next wk.  I reminded her of her follow up appt @ the Device Clinic on Tuesday 8/5/25 at 11:00 am.  She offers no complaints and is thankful for the call.

## 2025-08-05 ENCOUNTER — CLINICAL SUPPORT (OUTPATIENT)
Dept: NON INVASIVE DIAGNOSTICS | Age: 75
End: 2025-08-05

## 2025-08-05 DIAGNOSIS — I51.9 HEART PROBLEM: Primary | ICD-10-CM

## (undated) DEVICE — PAD, DEFIB, ADULT, RADIOTRAN, PHYSIO, LO: Brand: MEDLINE

## (undated) DEVICE — DRESSING HYDROFIBER AQUACEL AG ADVANT 3.5X4 IN